# Patient Record
Sex: MALE
[De-identification: names, ages, dates, MRNs, and addresses within clinical notes are randomized per-mention and may not be internally consistent; named-entity substitution may affect disease eponyms.]

---

## 2019-09-18 ENCOUNTER — HOSPITAL ENCOUNTER (EMERGENCY)
Dept: HOSPITAL 72 - EMR | Age: 24
Discharge: HOME | End: 2019-09-18
Payer: SELF-PAY

## 2019-09-18 VITALS — BODY MASS INDEX: 25.9 KG/M2 | HEIGHT: 71 IN | WEIGHT: 185 LBS

## 2019-09-18 VITALS — DIASTOLIC BLOOD PRESSURE: 89 MMHG | SYSTOLIC BLOOD PRESSURE: 133 MMHG
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
## 2019-09-18 DIAGNOSIS — Z23: ICD-10-CM

## 2019-09-18 DIAGNOSIS — S61.412A: Primary | ICD-10-CM
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
## 2019-09-18 PROCEDURE — 99283 EMERGENCY DEPT VISIT LOW MDM: CPT

## 2019-09-18 PROCEDURE — 73130 X-RAY EXAM OF HAND: CPT

## 2019-09-18 PROCEDURE — 90471 IMMUNIZATION ADMIN: CPT

## 2019-09-18 PROCEDURE — 90715 TDAP VACCINE 7 YRS/> IM: CPT

## 2019-09-18 PROCEDURE — 12041 INTMD RPR N-HF/GENIT 2.5CM/<: CPT

## 2019-09-18 NOTE — EMERGENCY ROOM REPORT
History of Present Illness


General


Chief Complaint:  Laceration


Source:  Patient





Present Illness


HPI


23-year-old male with no symptom past medical history here complaining of a 

laceration to the left thenar side of hand that occurred today.  Patient is 

here with his brother reports that they were working together and branches were 

treated as a branch of the cut through patient's left hand.  Patient has full 

mobility no motor or sensory deficits noted.  Patient is rating pain 10 out of 

10 scant amount of bleeding is noted.  Patient is not on any blood thinners.  

Denies tingling or numbness.  Has not taken medication for pain.  Patient is 

cash pay and does not have insurance.  Reports all other injuries, is not up-to-

date with tetanus shot.  Denies chest pain, shortness of breath associated 

symptoms.


Allergies:  


Coded Allergies:  


     No Known Allergies (Unverified , 9/18/19)





Patient History


Past Medical History:  see triage record


Past Surgical History:  unable to obtain


Pertinent Family History:  none


Immunizations:  other - tdap given today


Reviewed Nursing Documentation:  PMH: Agreed; PSxH: Agreed





Nursing Documentation-PMH


Past Medical History:  No Stated History





Review of Systems


All Other Systems:  negative except mentioned in HPI





Physical Exam





Vital Signs








  Date Time  Temp Pulse Resp B/P (MAP) Pulse Ox O2 Delivery O2 Flow Rate FiO2


 


9/18/19 11:47 98.4 76 16 133/89 (104) 98 Room Air  








Sp02 EP Interpretation:  reviewed, normal


General Appearance:  no apparent distress, alert, GCS 15, non-toxic


Head:  normocephalic, atraumatic


Eyes:  bilateral eye normal inspection, bilateral eye PERRL


ENT:  hearing grossly normal, normal pharynx, no angioedema, normal voice


Neck:  full range of motion, supple, supple/symm/no masses


Respiratory:  chest non-tender, lungs clear, normal breath sounds, no rhonchi, 

no wheezing, speaking full sentences


Cardiovascular #1:  regular rate, rhythm, no edema, no murmur, normal capillary 

refill


Cardiovascular #2:  2+ radial (R), 2+ radial (L)


Gastrointestinal:  normal bowel sounds, non tender, soft, non-distended, no 

guarding, no rebound


Genitourinary:  normal inspection, no CVA tenderness


Musculoskeletal:  back normal, digits/nails normal, gait/station normal, normal 

range of motion, non-tender


Neurologic:  alert, oriented x3, responsive, motor strength/tone normal, 

sensory intact, speech normal


Psychiatric:  judgement/insight normal, memory normal, mood/affect normal, no 

suicidal/homicidal ideation


Skin:  laceration - deep lac in thenar side left hand, no bone exposed.


Lymphatic:  normal inspection, no adenopathy





Procedures


Splinting


Splinting :  


   Consent:  Verbal


   Location:  left thumb


   Hand-Made Type:  plaster


   Splint:  thumb spica


   Pre-Proc Neuro Vasc Exam:  normal


   Post-Proc Neuro Vasc Exam:  normal


   Patient Tolerated:  Well


   Complications:  None





Laceration/Wound Repair


Laceration/Wound Repair :  


   Consent:  Verbal


   Wound Location:  upper extremity - thenar side left hand


   Wound's Depth, Shape:  into muscle


   Wound Length (cm):  1


   Wound Explored:  contaminated


   Irrigated w/ Saline (ccs):  10


   Betadine Prep?:  Yes


   Anesthesia:  1% Lidocaine


   Volume Anesthetic (ccs):  10


   Wound Debrided:  minimal


   Wound Repaired With:  sutures


   Suture Size/Type:  5:0, proline


   Number of Sutures:  8


   Layer Closure?:  Yes


   Sterile Dressing Applied?:  Yes


   Splint Applied?:  Yes


   Type of Splint Applied:  thumb spica


   Sling Applied?:  No


   Patient Tolerated:  Well


   Complications:  None





Medical Decision Making


PA Attestation


All my diagnosis and treatment plans were reviewed ad discussed with my 

supervising physician Dr. Rodriguez


Diagnostic Impression:  


 Primary Impression:  


 Finger laceration


ER Course


23-year-old male with no symptom past medical history here complaining of a 

laceration to the left thenar side of hand that occurred today.  Patient is 

here with his brother reports that they were working together and branches were 

treated as a branch of the cut through patient's left hand.  Patient has full 

mobility no motor or sensory deficits noted.  Patient is rating pain 10 out of 

10 scant amount of bleeding is noted.  Patient is not on any blood thinners.  

Denies tingling or numbness.  Has not taken medication for pain.  Patient is 

cash pay and does not have insurance.  Reports all other injuries, is not up-to-

date with tetanus shot.  Denies chest pain, shortness of breath associated 

symptoms.





Ddx considered but are not limited to : Superficial laceration, deep laceration

, tendon involvement with laceration, laceration with foreign body





Vital signs: are WNL, pt. is afebrile





H&PE are most consistent with: Deep laceration of left hand





ORDERS: Left hand x-ray, Tdap, Keflex, ibuprofen, Tylenol 3


ED INTERVENTIONS: Thumb spica, Tylenol 3, Tdap, wound closure





DISCHARGE: At this time pt. is stable for d/c to home. Will provide printed 

patient care instructions, and any necessary prescriptions. Care plan and 

follow up instructions have been discussed with the patient prior to discharge.

  After discussing with my supervising physician Dr. Rodriguez, and patient lack 

of insurance was decided mutually to reparative home and have patient follow-up 

with hand specialist as needed as he may have motor or sensory deficit later.  

Patient to keep the splint on until seen by specialist.


Other X-Ray Diagnostic Results


Other X-Ray Diagnostic Results :  


   X-Ray ordered:  left hand


   # of Views/Limited Vs Complete:  3 View


   Indication:  Pain


   EP Interpretation:  Yes


   PA Xray:  Interpretation reviewed, by supervising MD, and agrees with 

findings.


   Interpretation:  no dislocation, no soft tissue swelling, no fractures, 

other - no FB


   Impression:  No acute disease


   Electronically Signed by:  Richa Ramirez PA-C





Last Vital Signs








  Date Time  Temp Pulse Resp B/P (MAP) Pulse Ox O2 Delivery O2 Flow Rate FiO2


 


9/18/19 11:47 98.4 76 16 133/89 98 Room Air  








Disposition:  HOME, SELF-CARE


Condition:  Stable


Scripts


Acetaminophen With Codeine (T#3) (TYLENOL #3 TAB*) Y Tab


1 TAB ORAL Q12HR PRN for For Pain for 4 Days, #8 TAB


   Prov: Richa Lucero         9/18/19 


Ibuprofen (Ibu) 800 Mg Tablet


800 MG PO TID, #30 TAB


   Prov: Richa Lucero         9/18/19 


Cephalexin* (KEFLEX*) 500 Mg Capsule


500 MG ORAL EVERY 6 HOURS for 7 Days, #28 CAP


   Prov: Richa Lucero         9/18/19


Patient Instructions:  Laceration Care, Adult





Additional Instructions:  


Take medication as directed keep splint on it is advised to follow-up with a 

hand specialist which I will give you a list of free clinics that you can go 

into for possible referral sutures to be removed in 7 to 10 days.











Richa Lucero Sep 18, 2019 14:06

## 2019-09-18 NOTE — NUR
ER DISCHARGE NOTE:dry dressing and spika splint placed on left wrist, pain med 
given

Patient is cleared to be discharged per ERMD, pt is aox4, on room air, with 
stable vital signs. pt was given dc and prescription instructions, pt was able 
to verbalize understanding, pt is able to ambulate with steady gait. pt took 
all belongings.

## 2019-09-18 NOTE — NUR
ED Nurse Note:





Pt came in due to cut on his left hand in between left thumb and index finger 
after pulling a bracnh of a tree x15 mins ago. no tetanus shot. Bleeding is 
controlled. AAO x4 and ambulatory. Brother at the bed side.

## 2019-09-18 NOTE — DIAGNOSTIC IMAGING REPORT
Indication: Pain status post injury. Assess for foreign body.

 

Technique: XRAY Hand Complete L

 

Comparison: None

 

Findings/Impression: 

 

Bony mineralization within normal limits. There is no evidence of acute fracture or

dislocation. Alignment and joint spaces are maintained. No radiopaque foreign body

identified.

## 2020-09-22 ENCOUNTER — HOSPITAL ENCOUNTER (EMERGENCY)
Dept: HOSPITAL 87 - ER | Age: 25
Discharge: HOME | End: 2020-09-22
Payer: COMMERCIAL

## 2020-09-22 VITALS — SYSTOLIC BLOOD PRESSURE: 137 MMHG | DIASTOLIC BLOOD PRESSURE: 89 MMHG

## 2020-09-22 VITALS — WEIGHT: 174.17 LBS | HEIGHT: 68 IN | BODY MASS INDEX: 26.4 KG/M2

## 2020-09-22 DIAGNOSIS — M54.12: Primary | ICD-10-CM

## 2020-09-22 DIAGNOSIS — I49.9: ICD-10-CM

## 2020-09-22 PROCEDURE — 99283 EMERGENCY DEPT VISIT LOW MDM: CPT

## 2020-09-22 PROCEDURE — 93005 ELECTROCARDIOGRAM TRACING: CPT
